# Patient Record
Sex: MALE | Race: WHITE | URBAN - METROPOLITAN AREA
[De-identification: names, ages, dates, MRNs, and addresses within clinical notes are randomized per-mention and may not be internally consistent; named-entity substitution may affect disease eponyms.]

---

## 2018-06-06 ENCOUNTER — OFFICE VISIT (OUTPATIENT)
Dept: NEUROPSYCHOLOGY | Facility: CLINIC | Age: 28
End: 2018-06-06
Payer: COMMERCIAL

## 2018-06-06 DIAGNOSIS — F20.3 UNDIFFERENTIATED SCHIZOPHRENIA (H): Primary | ICD-10-CM

## 2018-06-06 NOTE — MR AVS SNAPSHOT
After Visit Summary   2018    Carlos Tomlinson    MRN: [id number]           Patient Information     Date Of Birth          1990        Visit Information        Provider Department      2018 8:00 AM Russel Chao, PhD  Health Neuropsychology        Today's Diagnoses     Undifferentiated schizophrenia (H)    -  1       Follow-ups after your visit        Who to contact     Please call your clinic at [phone] to:    Ask questions about your health    Make or cancel appointments    Discuss your medicines    Learn about your test results    Speak to your doctor            Additional Information About Your Visit        MyChart Information     [employer]ogy is an electronic gateway that provides easy, online access to your medical records. With S[employer]ogy, you can request a clinic appointment, read your test results, renew a prescription or communicate with your care team.     To sign up for [employer] visit the website at [email or web address]   You will be asked to enter the access code listed below, as well as some personal information. Please follow the directions to create your username and password.     Your access code is: [email or web address]  Expires: 2018  6:31 AM     Your access code will  in 90 days. If you need help or a new code, please contact your [facility] or call [phone] for assistance.        Care EveryWhere ID     This is your Care EveryWhere ID. This could be used by other organizations to access your [place] medical records  [id number]         Blood Pressure from Last 3 Encounters:   No data found for BP    Weight from Last 3 Encounters:   No data found for Wt              We Performed the Following     [id number] TESTING, PER HR/PSYCHOLOGIST     NEUROPSYCH TESTING BY [facility]        Primary Care Provider    None Specified       No primary provider on file.        Equal Access to Services     ESME MACHUCA AH: Hadii wendy  hardeep Quintero, john ochoa, shayelissa carrizalesalverto daviadelita, waxjudi idiin hayelizabethyohana piercewilbured pearson jonh. So [facility] [phone].    ATENCIÓN: Si habla español, tiene a amin disposición servicios gratuitos de asistencia lingüística. Llame al [phone].    We comply with applicable federal civil rights laws and [place] laws. We do not discriminate on the basis of race, color, national origin, age, disability, sex, sexual orientation, or gender identity.            Thank you!     Thank you for choosing [facility] NEUROPSYCHOLOGY  for your care. Our goal is always to provide you with excellent care. Hearing back from our patients is one way we can continue to improve our services. Please take a few minutes to complete the written survey that you may receive in the mail after your visit with us. Thank you!             Your Updated Medication List - Protect others around you: Learn how to safely use, store and throw away your medicines at www.disposemymeds.org.      Notice  As of 6/6/2018 11:59 PM    You have not been prescribed any medications.

## 2018-06-06 NOTE — PROGRESS NOTES
The patient was seen for neuropsychological evaluation at the request of Dr. Lion Tom for the purposes of diagnostic clarification and treatment planning. 3 hours of face-to-face testing were provided by this writer. Please see Dr. Russel Chao's report for a full interpretation of the findings.    Salima Cook MA  Psychometrist

## 2018-06-12 NOTE — PROGRESS NOTES
NAME: Carlos Tomlinson  MRN: [id number]  : 1990  MCLEOD: 2018    Neuropsychology Laboratory  [facility]  [address]  [place]  [address] [phone]    NEUROPSYCHOLOGICAL EVALUATION    RELEVANT HISTORY AND REASON FOR REFERRAL    This is a report of neuropsychological consultation regarding Kavin Tomlinson, a 27-year-old, right-handed man with 16 years of formal education. He presents with concerns about cognitive decline and atypical movements beginning about a year and nine months ago. He has had multiple neurologic and psychiatric evaluations in that time at [facility], [facility], and [facility], and the consensus concern is for psychosis. He is referred for neuropsychological assessment of brain function by his primary care provider, Dr. Kai Tom of [facility].     Mr. Tomlinson has a complex medical, psychosocial, and cognitive history. Records from Dr. Tom s office describe language-related concerns (dysgraphia, dyslexia) since around age 5, repeating , chronic infections, daily migraines starting in 3rd grade, diagnosis and stimulant treatment of ADHD in 3rd or 4th grade, home schooling in 6th grade, worsening headaches treated with gabapentin, reactions to honeycomb and flu shots requiring hospital attention, missing school in bell high for stomach problems, treatment with IV IgG, mononucleosis and aseptic meningitis in 8th grade, completing high school at an academically rigorous school, going to college in [place], worsening of physical and cognitive functioning in  after discontinuing IgG and psychostimulants (extreme social anxiety was attributed to stimulants), tutoring support in college, and completing his college degree in 2016 after 6 years in school.    In 2016, he apparently began hearing voices and having visual hallucinations. A trial of risperidone was seen as making  everything worse. He established care with Dr. Tom in February 2017, after moving moved back to [place]. He broke up with his girlfriend (now back together but she is still in [place]) and lost a job. He began showing marked social, emotional, and cognitive changes, especially an atypical degradation in speech output and quality. Mercury levels were high and he had chelation therapy. There was some concern about Lyme disease. He seemed to improve while on antibiotics for a couple of weeks (a psychiatrist at [facility] suggested it was due to the antibiotic s secondary glutamatergic effects).     His parents tell me that there is no history of stroke, seizure, or TBI. He has had extensive workups through [facility], [facility], and [facility] providers; the records can be seen in Care Everywhere. Some of those notes also reference workups at [facility]. June 2017 notes from Dr. Yoan Zuniga state that there has been no explanatory data from  lumbar puncture  Lyme PCR, antibody studies, [place] autoimmune encephalopathy panel, VDRL, cell count, IgG synthesis and oligoclonal banding, fungal, AFB and bacterial cultures and stains, among other tests. Additionally, extensive serum testing has been done, including DANIELA, ESR, HIV, BMP, Lyme antibody and treponemal screen, hepatic function testing, ceruloplasmin, among others.  EEG studies were reportedly normal at [facility] in 2017. EEG studies at [facility] this spring were negative for epileptogenic events and notable for clinically observed movements having no electrographic correlates. Multiple head CTs and brain MRIs were normal in 2017 and 2018. As I look over various notes from the last year, psychosis has been the predominant concern.     His functioning deteriorated in March 2018, and he was hospitalized at [facility]. He demonstrated positive, negative, and disorganized psychotic symptoms. He is now largely  non-communicative, but this is variable. He also has atypical jerking movements. His mother tells me his movements and communication improve notably when his girlfriend is in town.     He was seen for several psychotherapy sessions by Dr. Maeve Jones from February to May this year, but she terminated treatment because of his inability to participate in sessions. Her final note indicates a diagnosis of conversion disorder.     He was prescribed duloxetine, hydroxyzine, clonazepam this spring. His parents tell me he took these for about 3-4 weeks and showed improvements in his symptoms. However, he quit taking them and cannot/will not explain to his family why he did so. He is now taking occasional doses of alprazolam. His psychiatrist, Dr. Lopez at [facility], has prescribed lurasidone, but Mr. Tomlinson has not been interested in filling it. Session notes describe general mutism, despite demonstrating apparent investment in treatment by bicycling to the appointments from [place] and arriving early. 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    Mr. Tomlinson is not working. He lives at home with his parents and an older sister. The sister has significant physical issues, including GAD65 antibodies, dyskinesia, dysautonomia/POTS, autoimmune hepatitis, seronegative RA, and fatigue. His mother reports problems with depression, anxiety, and ADHD for herself, her , and 4 of her 5 children (such issues not prominent for the sister described above). Outside records indicate diagnoses of conversion disorder, refractory SVT, chronic fatigue, and multiple chemical sensitivities for his mother, chronic fatigue and multiple chemical sensitivities for a sister, and substance abuse and anger issues for a brother. Neurologically, there is a history of cognitive decline for his maternal grandfather in his 80s, as well as stroke for his paternal grandfather.     His mother provided a copy of  neuropsychological testing from when Mr. Tomlinson was 10 years old. He had multiple evaluations from early elementary into high school. She did not have the other reports, but his mother says the results were always notably consistent. Intellectual test performances were in the average range in summation but varied from borderline impaired to high average across the individual subtests. There were weaknesses in cognitive speed and visuospatial/constructional abilities. Reading skills and reading comprehension were average, but writing functions were below normal. Mathematical abilities ranged from borderline to average. Cognitive and psychomotor speed tests were impaired, and complex attentional/executive control was below normal (especially visuomotor vigilance and several aspects of auditory processing). Memory performances were in the average range. His left hand s dexterity was poorer than his right s, but both were slowed. He was described as restless, fidgety, disinhibited, and distractible. Parent- and self-report questionnaires indicated issues with inattentiveness, hyperactivity, somatic issues, fatigue, worry, and a sense of hopelessness. Formal diagnoses were not listed in the report, but attentional/executive dysfunction was seen as a central problem.     BEHAVIORAL OBSERVATIONS    Mr. Tomlinson was polite and passively cooperative with this exam. His behavioral presentation was odd and inconsistent. He responded normally to my initial greeting when I came into the exam room (e.g., turned to see me, shook my hand, said hello), but he was then entirely non-responsive to all questions, regardless of time allowed or simplicity of formulation. He had a fixed stare and seemed to be suppressing smiles. After several minutes, I informed him I would step out to grab his parents, and he immediately provided an appropriate verbal response ( okay ). He was minimally more responsive when his parents were in the  room. He did begin to display occasional jerking motions of his head and shoulder, back and to the right. He would respond yes/no if his mother repeated the questions and touched his arm to prompt him to speak. His mother noted that he appeared anxious and offered to get a dose of alprazolam for him. He declined. His behaviors varied more during the testing session with my psychometrist. At baseline, he was non-communicative. His only nonverbal communication was to point to answers on some tests (i.e., complete absence of head nodding/shaking or gestures). There was no echolalia. He showed a pervasive lack of response to nonverbal communication attempts from the examiner. Social reciprocity was absent. There were rare bursts of well-formed sentences that were contextually appropriate and showed a full range of prosody and emotional content (e.g.,  Could you please repeat the instructions, in a different way if you can?...[then, smiling] Or the same way! ). He only once engaged in general conversation, briefly describing a food he likes to eat. He repeatedly demonstrated comprehension of various test instructions by his ability to carry out the tasks. He was not responsive to requests to increase his speed of responding on timed tests. He also perseverated on approaches to many tests despite repeated prompts to shift his behaviors. Response latencies ranged from 10 seconds to several minutes. The pace of testing was remarkably slow, but there were a few tests wherein he made quick and spontaneous actions. His affective display was blank and largely unreactive, with a fixed stare and contextually inappropriate smiling and laughter. He demonstrated odd behaviors, such as spreading open his eyelids and physically rubbing his eyeball when asked if he was wearing contacts. At one point, he picked up a piece of testing apparatus that had dropped on the floor. When the examiner thanked him, he retracted it, held it near his  head, and dropped it back on the floor. He then stared at the wall without expression. Some of his verbal responses were bizarre. For example, on a test asking him to describe how two things are alike, he gave nonsensical responses such as,  Five eyes them five eyes them,   white eyes them,  and  I and I produce stool.  When asked for his home address, he stated,  [address]  Overall, there were copious indications of mental and behavioral dysregulation and reduced ability to participate meaningfully in the exam. Nonetheless, there were several tests that he performed surprisingly well on, and he persisted with all requested tests. A direct measure of cognitive performance validity was abnormal for individuals with depression as the primary issue but fully normal compared to patients with known learning disabilities and with psychotic disorders. I strongly suspect that the data would not replicate well if he were engaged in beneficial pharmacologic psychiatric treatment, but the data can be taken as reflections of his current functional capacity.     MEASURES ADMINISTERED    The following measures were administered by a trained psychometrist, under my direct supervision:    Orientation Questionnaire: Time, Place, Basic Personal Information, Recent U.S. Presidents; Wide Range Achievement Test 4: Word Reading, Math Computation; Wechsler Adult Intelligence Scale-IV: Similarities, Matrix Reasoning, Coding; Controlled Oral Word Association Test; [place] Speed of Reading Test; Karla Visual Acuity Screen (patient unable to perform); [place] Making Test; [place] Card Sorting Test; Juan Francisco-Osterrieth Complex Figure Test; Juan Francisco Auditory Verbal Learning Test; Wechsler Memory Scale-IV: Logical Memory, Visual Reproduction; Grooved Pegboard; Dot Counting Test.    RESULTS AND INTERPRETATION    Orientation: Mr. Tomlinson was unable to spontaneously demonstrate orientation to time; with many prompts, his  temporal orientation was borderline. He was able to name the current U.S. president but did not provide responses when asked to name other recent presidents. He was minimally oriented to place (named adjacent city, no guess for specific location). He was normally oriented to basic personal information.     Academic Achievement: Basic reading, pronunciation, and phonetic decoding skills were average. Written math computation skills were very impaired.     Intellectual Abilities: Abstract verbal analogical reasoning was impaired. Visual reasoning through pattern identification was average.     Language & Related Skills: As noted above, reading and pronunciation of single words was average. Reading of paragraph-length information was severely impaired for speed and also showed problems with accurate comprehension. Associative verbal fluency was in the borderline range for his age and educational level, with two trials of low output and one trial with a fully normal level of output.     Perceptual & Visuoconstructional Skills: Mr. Tomlinson appeared unable to respond appropriately to stimuli on Karla visual acuity screening, seeming to state  random  numbers and being unable to repeat the correct numbers on the largest line when the examiner was pointing to them and saying the numbers. However, his performances on the visual pattern identification task described above and multiple drawing tasks contraindicated the presence of a fundamental perceptual defect. Drawings of simple geometric figures were well-formed, precise, and easily recognizable. When asked to copy a complex figure, he worked slowly and meticulously, erasing and redrawing many elements until satisfied. He had to be cut off after 10 minutes, and his drawing was essentially perfect.    Motor Skills: Speeded fine-motor dexterity for placing pegs into holes was profoundly impaired with the dominant right hand and borderline impaired with the left hand.      Mental Speed & Executive Functioning: As noted above, speeded verbal fluency was variable but overall borderline, and reading speed was impaired. Graphomotor clerical speed for digit-symbol substitution was borderline. Visual scanning and sequencing (trail making) under simple conditions was severely impaired for speed but error-free. Trail making under more complex demands for controlling divided attention was mildly impaired for speed and remained error-free. Conceptualization, inductive reasoning, and using continuous feedback to correct errors were very impaired on an ambiguous card-sorting test. His responses on this test were extremely perseverative, and he made an unusually high number of responses suggesting he was trying to sort the cards according to illogical parameters.    Learning & Anterograde Memory: A few minutes after the initial copy, incidental free recall of the complex figure was in the upper average range. After 30 minutes, free recall remained upper average, whereas recognition of individual figure elements was impaired. On another test, immediate visual memory for simple figures was average, delayed free recall was average, and recognition of the simple figures was low average. Immediate verbal memory for short stories was impaired. Delayed story recall was impaired, and delayed recognition of story details was performed near chance levels. Learning a word list over repeated trials was in the borderline range for his age, but he showed a good learning slope over trials. Free recall of the list was average after a brief delay with active interference, and remarkable for providing 2 more words than he had on any individual learning trial. Delayed free recall of the list remained average after 30- and 60-minute intervals. List recognition was mildly impaired.     Emotional, Behavioral, & Personality Functioning: Given his present condition and demonstrated problems with reading  comprehension, all psychometric assessments of mood, anxiety, and personality functioning were deferred.     IMPRESSIONS    The neuropsychological results are abnormal. There are prominent problems with executive functioning and many, but not all, verbally-mediated abilities. Memory test performances contraindicate a primary amnestic condition. There are generally borderline impaired performances for tests of mental speed. Fine-motor dexterity is far worse for the dominant right hand compared to the nondominant left hand.     His presenting concerns began with reports of auditory and visual hallucinations. At this time, there are copious behavioral indications of formal thought disorder and negative symptoms of psychosis. I am not overly suspicious about conversion disorder or malingering, but I cannot rule them out. Overall, the history and presentation are most concerning for undifferentiated schizophrenia.    As I believe Mr. Tomlinson is in the midst of psychosis and is currently unmedicated, I will defer making any inferences about focal or systemic brain functioning based on the cognitive data or in comparison to earlier neuropsychological data.     RECOMMENDATIONS    My primary recommendation is to follow Dr. Lopez s advice on initiating lurasidone or other antipsychotic medications. It is understandable that he would be hesitant to try another antipsychotic, after his negative response to risperidone. Alternatively, he might be persuaded to resume the prior regimen of duloxetine and hydroxyzine. I defer to his prescribing physicians but would generally discourage long-term reliance on benzodiazepines.     If he does not respond to Dr. Lopez s persuasions to try medications or otherwise cannot participate in the treatment plan, consideration could be given to the [facility] First Episode Psychosis clinic. However, the family is very pleased with Dr. Lopez s efforts and bedside manner, and I strongly  encourage efforts to sustain their therapeutic relationship.     Hopefully, his functioning will improve once he finds the right medication regimen. I m sure it will take time to find the right regimen and to arrive at a therapeutic state. After a sustained response to treatment--perhaps in a year or so--it might be reasonable to reattempt a comprehensive neuropsychological assessment.     Russel Chao, PhD,   Clinical Neuropsychologist      Time spent:  Four hours professional time, including interview, family consultation, records review, data integration, and report writing (CPT 85276); an additional three hours, including testing administered by a psychometrist and interpreted by a neuropsychologist (CPT 88593). ICD-10 diagnosis: F20.3

## 2018-06-13 NOTE — PROGRESS NOTES
Date: 18  Staff: LISSETTE  Tech:     WILDER  NAME:  Carlos Tomlinson  MRN:  [id number]  :  [birth year]  Age: 27  Sex: Male  Hand: Right  Educ:  16    Orientation     Time  -16 (-3 if using later response regarding date)     Place  0.5/2     Personal info      Presidents     DCT   Err: 0 UG: 10  E-score: 14    WRAT4   SS %ile Grade Equiv.     Word Reading  101 53 12.7     Math Computation 65 1 5.7    WAIS-IV   Raw SS     Similarities  3 1     Matrix Reasoning 19 10     Coding  44 5    COWAT (FAS)   Raw: 26  SS: 7 T: 32    [facility] Speed of Reading Test   Raw:  Err: 1 M(SD): 21.2 (4.0)    Trail Making Test    Raw  Err SS T   A 62  0 5 18   B 123  0 7 36    WCST (64 cards)  Raw %ile   Categories: 1 2-5  Total Correct: 22 --   Total Errors: 42 1   Persev. Err.: 28 <1   Concept. Resp.: 13 1   FTMS:   0   LTL:  N/A   ** Made 7  Other  sorts **     Juan Francisco-O   Raw T %ile     Time to Copy  600   ?1     Copy    36  >16     Short Delay Recall 26.5 55 69     Long Delay Recall 26 54 66     Recognition Total 17 <20 <1    AVLT     Trial 1       2       3       4      5      B       6     30      60    4       5       3       8      8      4      10      9      10     Mean (SD)   5: 12.9(1.8)   6: 11.5(2.3)  30 : 11.3(2.5)     30  Recog Hits/FPs 12/1 M(SD): 14.3 (1.1)      60  Recog Hits/FPs 10/0      AVLT-X Exagg. Index 1    WMS-IV  Raw SS/%ile     LM I  10 3     LM II  6 3     LM Recog. 13 ?2nd     VR I  38 10     VR II  25 9     VR Recog. 5 17-25th    Grooved Pegboard    Raw  Drops SS T   RH  142  0 2 9   LH 83 1 7 31

## 2019-09-17 ENCOUNTER — TELEPHONE (OUTPATIENT)
Dept: PSYCHIATRY | Facility: CLINIC | Age: 29
End: 2019-09-17